# Patient Record
Sex: FEMALE | Race: BLACK OR AFRICAN AMERICAN | Employment: UNEMPLOYED | URBAN - METROPOLITAN AREA
[De-identification: names, ages, dates, MRNs, and addresses within clinical notes are randomized per-mention and may not be internally consistent; named-entity substitution may affect disease eponyms.]

---

## 2022-05-03 ENCOUNTER — OFFICE VISIT (OUTPATIENT)
Dept: URGENT CARE | Facility: CLINIC | Age: 42
End: 2022-05-03

## 2022-05-03 VITALS
OXYGEN SATURATION: 97 % | DIASTOLIC BLOOD PRESSURE: 94 MMHG | HEART RATE: 96 BPM | TEMPERATURE: 98 F | RESPIRATION RATE: 16 BRPM | BODY MASS INDEX: 40.17 KG/M2 | SYSTOLIC BLOOD PRESSURE: 156 MMHG | WEIGHT: 271.19 LBS | HEIGHT: 69 IN

## 2022-05-03 DIAGNOSIS — K14.0 GLOSSITIS: Primary | ICD-10-CM

## 2022-05-03 PROCEDURE — 99214 PR OFFICE/OUTPT VISIT, EST, LEVL IV, 30-39 MIN: ICD-10-PCS | Mod: TIER,S$GLB,, | Performed by: PHYSICIAN ASSISTANT

## 2022-05-03 PROCEDURE — 99214 OFFICE O/P EST MOD 30 MIN: CPT | Mod: TIER,S$GLB,, | Performed by: PHYSICIAN ASSISTANT

## 2022-05-03 NOTE — PATIENT INSTRUCTIONS
It is important that you see a primary care provider for further evaluation and management of your symptoms. You will needs lab work in addition to other possible tests.     If you have a vitamin B12 deficiency, you should take 1,000-2,000 mcg of vitamin B12 once daily. You may also need a folic acid supplement.     You must understand that you've received an Urgent Care treatment only and that you may be released before all your medical problems are known or treated. You, the patient, will arrange for follow up care as instructed.      Follow up with your PCP or specialty clinic as instructed in the next 2-3 days if not improved or as needed. You can call [phone] to schedule an appointment with appropriate provider.      If you condition worsens, we recommend that you receive another evaluation at the emergency room immediately or contact your primary medical clinic's after hours call service to discuss your concerns.      Please return here or go to the Emergency Department for any concerns or worsening condition.

## 2022-05-03 NOTE — PROGRESS NOTES
"Subjective:       Patient ID: Reyna Driver is a 41 y.o. female.    Vitals:  height is 5' 8.5" (1.74 m) and weight is 123 kg (271 lb 3.2 oz). Her temperature is 98.4 °F (36.9 °C). Her blood pressure is 156/94 (abnormal) and her pulse is 96. Her respiration is 16 and oxygen saturation is 97%.     Chief Complaint: Oral Pain    Pt presents an inflamed tiongue for a couple months.     Patient provider note starts here:  Patient presents with a 2 month history of tongue redness, pain and smoothing of her tongue.  She believes that this is in relation to a vitamin B12 deficiency that she has had since she has had gastric sleeve surgery.  Reports that she takes an unknown amount of vitamin B12 supplements throughout the day.  She has not tried any medication for her tongue pain specifically.  Denies swelling of the tongue, difficulty breathing. He has not seen a provider nor had labs drawn in a while, [place]     Oral Pain   This is a new problem. The current episode started today. The problem occurs constantly. The pain is at a severity of 7/10. The pain is moderate. Pertinent negatives include no fever. She has tried nothing for the symptoms. The treatment provided no relief.       Constitution: Negative for fever.   HENT: Positive for tongue pain. Negative for mouth sores and tongue lesion.    Neck: Negative for neck pain.   Cardiovascular: Negative for chest pain, palpitations and sob on exertion.   Respiratory: Negative for chest tightness, shortness of breath and wheezing.    Gastrointestinal: Negative for abdominal pain, vomiting and diarrhea.   Skin: Negative for color change and wound.   Neurological: Negative for numbness and tingling.       Objective:      Physical Exam   Constitutional: She is oriented to person, place, and time. She appears well-developed. She is cooperative.  Non-toxic appearance. She does not appear ill. No distress.   HENT:   Head: Normocephalic and " atraumatic.   Ears:   Right Ear: Hearing and external ear normal.   Left Ear: Hearing and external ear normal.   Nose: Nose normal. No mucosal edema, rhinorrhea or nasal deformity. No epistaxis. Right sinus exhibits no maxillary sinus tenderness and no frontal sinus tenderness. Left sinus exhibits no maxillary sinus tenderness and no frontal sinus tenderness.   Mouth/Throat: Uvula is midline, oropharynx is clear and moist and mucous membranes are normal. No trismus in the jaw. Normal dentition. No uvula swelling. No oropharyngeal exudate, posterior oropharyngeal edema or posterior oropharyngeal erythema.      Comments: Geographic tongue noted. There is mild erythema of the tongue. The tongue also appears dry. No oral lesions or tongue lesions noted.   Eyes: Conjunctivae and lids are normal. No scleral icterus.   Neck: Trachea normal and phonation normal. Neck supple. No edema present. No erythema present. No neck rigidity present.   Cardiovascular: Normal rate and normal pulses.   Pulmonary/Chest: Effort normal. No stridor. No respiratory distress. She has no decreased breath sounds.   Abdominal: Normal appearance.   Musculoskeletal: Normal range of motion.         General: No deformity. Normal range of motion.   Neurological: She is alert and oriented to person, place, and time. She exhibits normal muscle tone. Coordination normal.   Skin: Skin is warm, dry, intact, not diaphoretic and not pale.   Psychiatric: Her speech is normal and behavior is normal. Judgment and thought content normal.   Nursing note and vitals reviewed.        Assessment:       1. Glossitis          Plan:         Glossitis  -     (Magic mouthwash) 1:1:1 diphenhydramine(Benadryl) 12.5mg/5ml liq, aluminum & magnesium hydroxide-simethicone (Maalox), LIDOcaine viscous 2%; Swish and spit 5 mLs every 4 (four) hours as needed (TONGUE PAIN). for mouth sores  Dispense: 90 mL; Refill: 0  -     Ambulatory referral/consult to Internal Medicine            Medical Decision Making:   History:   Old Medical Records: I decided to obtain old medical records.  Differential Diagnosis:   Differential diagnosis includes but is not limited to: trauma (burn), vitamin B12 deficiency, glossitis, thrush  Urgent Care Management:  Patient presents with complaints tongue irritation, erythema and smoothing of the tongue.  On exam, she is afebrile and nontoxic appearing.  She is not sure how many mcg vitamin B12 she takes per day but she has a feeling this is related to a vitamin B12 deficiency after having gastric sleeve surgery years ago.  I have instructed that she should take 1-2000mcg of vitamin B12 once daily and have prescribed magic mouthwash for her.  I have also placed a referral for Internal Medicine in addition to given [facility] clinic since patient is self pay.  Have advised that it would be wise for her to have vitamin levels drawn to further guide her supplementations.  She verbalized understanding and agreed with plan.       Patient Instructions   It is important that you see a primary care provider for further evaluation and management of your symptoms. You will needs lab work in addition to other possible tests.     If you have a vitamin B12 deficiency, you should take 1,000-2,000 mcg of vitamin B12 once daily. You may also need a folic acid supplement.     You must understand that you've received an Urgent Care treatment only and that you may be released before all your medical problems are known or treated. You, the patient, will arrange for follow up care as instructed.      Follow up with your PCP or specialty clinic as instructed in the next 2-3 days if not improved or as needed. You can call [phone] to schedule an appointment with appropriate provider.      If you condition worsens, we recommend that you receive another evaluation at the emergency room immediately or contact your primary medical clinic's after hours call service to discuss  your concerns.      Please return here or go to the Emergency Department for any concerns or worsening condition.